# Patient Record
Sex: MALE | Race: WHITE | Employment: OTHER | URBAN - METROPOLITAN AREA
[De-identification: names, ages, dates, MRNs, and addresses within clinical notes are randomized per-mention and may not be internally consistent; named-entity substitution may affect disease eponyms.]

---

## 2018-08-17 ENCOUNTER — HOSPITAL ENCOUNTER (EMERGENCY)
Facility: CLINIC | Age: 63
Discharge: HOME | End: 2018-08-17
Attending: EMERGENCY MEDICINE | Admitting: PSYCHIATRY & NEUROLOGY
Payer: MEDICARE

## 2018-08-17 VITALS
HEART RATE: 66 BPM | OXYGEN SATURATION: 98 % | SYSTOLIC BLOOD PRESSURE: 109 MMHG | RESPIRATION RATE: 16 BRPM | DIASTOLIC BLOOD PRESSURE: 63 MMHG | WEIGHT: 150 LBS | TEMPERATURE: 98.2 F

## 2018-08-17 DIAGNOSIS — F02.818 OTHER FRONTOTEMPORAL DEMENTIA WITH BEHAVIORAL DISTURBANCE: ICD-10-CM

## 2018-08-17 DIAGNOSIS — G31.09 OTHER FRONTOTEMPORAL DEMENTIA WITH BEHAVIORAL DISTURBANCE: ICD-10-CM

## 2018-08-17 LAB
AMPHETAMINES UR QL SCN: NEGATIVE
BARBITURATES UR QL: NEGATIVE
BENZODIAZ UR QL: NEGATIVE
CANNABINOIDS UR QL SCN: POSITIVE
COCAINE UR QL: NEGATIVE
ETHANOL UR QL SCN: NEGATIVE
OPIATES UR QL SCN: NEGATIVE

## 2018-08-17 PROCEDURE — 80307 DRUG TEST PRSMV CHEM ANLYZR: CPT | Performed by: PSYCHIATRY & NEUROLOGY

## 2018-08-17 PROCEDURE — 99285 EMERGENCY DEPT VISIT HI MDM: CPT | Mod: 25 | Performed by: PSYCHIATRY & NEUROLOGY

## 2018-08-17 PROCEDURE — 90791 PSYCH DIAGNOSTIC EVALUATION: CPT

## 2018-08-17 PROCEDURE — 80320 DRUG SCREEN QUANTALCOHOLS: CPT | Performed by: PSYCHIATRY & NEUROLOGY

## 2018-08-17 PROCEDURE — 99284 EMERGENCY DEPT VISIT MOD MDM: CPT | Mod: Z6 | Performed by: PSYCHIATRY & NEUROLOGY

## 2018-08-17 RX ORDER — DONEPEZIL HYDROCHLORIDE 5 MG/1
5 TABLET, FILM COATED ORAL AT BEDTIME
Qty: 30 TABLET | Refills: 1 | Status: SHIPPED | OUTPATIENT
Start: 2018-08-17

## 2018-08-17 ASSESSMENT — ENCOUNTER SYMPTOMS
DYSPHORIC MOOD: 0
SHORTNESS OF BREATH: 0
NERVOUS/ANXIOUS: 0
FEVER: 0
HALLUCINATIONS: 0
ABDOMINAL PAIN: 0

## 2018-08-17 NOTE — ED AVS SNAPSHOT
[facility], Emergency Department    [address]    [place] [address]    Phone:  [phone]    Fax:  [phone]                                       Coy Trejo   MRN: [id number]    Department:  [facility], Emergency Department   Date of Visit:  8/17/2018           Patient Information     Date Of Birth          1955        Your diagnoses for this visit were:     Other frontotemporal dementia with behavioral disturbance        You were seen by Jaswinder Ching DO and Tunde Nuñez MD.        Discharge Instructions       Start aricept 5 mg once a day    Use the resources given to get further evaluation on your memory    24 Hour Appointment Hotline       To make an appointment at any [facility], call [phone] ([phone]). If you don't have a family doctor or clinic, we will help you find one. [facility] clinics are conveniently located to serve the needs of you and your family.             Review of your medicines      START taking        Dose / Directions Last dose taken    donepezil 5 MG tablet   Commonly known as:  ARICEPT   Dose:  5 mg   Quantity:  30 tablet        Take 1 tablet (5 mg) by mouth At Bedtime   Refills:  1          Our records show that you are taking the medicines listed below. If these are incorrect, please call your family doctor or clinic.        Dose / Directions Last dose taken    ATORVASTATIN CALCIUM PO   Dose:  40 mg        Take 40 mg by mouth daily   Refills:  0        GLIMEPIRIDE PO   Dose:  2 mg        Take 2 mg by mouth 2 times daily (before meals)   Refills:  0        sitagliptin-metFORMIN  MG per tablet   Commonly known as:  JANUMET   Dose:  1 tablet        Take 1 tablet by mouth 2 times daily (with meals)   Refills:  0                Prescriptions were sent or printed at these locations (1 Prescription)                   Other Prescriptions                Printed at Department/Unit printer (1 of 1)         donepezil  "(ARICEPT) 5 MG tablet                Orders Needing Specimen Collection     Ordered          18 1751  Drug abuse screen 6 urine (chem dep) ([facility]) - STAT, [place]: STAT, Needs to be Collected     Scheduled Task Status   18 175 Collect Drug abuse screen 6 urine (chem dep) ([facility]) Open   Order Class:  PCU Collect                  Pending Results     No orders found from 8/15/2018 to 2018.            Pending Culture Results     No orders found from 8/15/2018 to 2018.            Pending Results Instructions     If you had any lab results that were not finalized at the time of your Discharge, you can call the ED Lab Result RN at [phone]. You will be contacted by this team for any positive Lab results or changes in treatment. The nurses are available 7 days a week from 10A to 6:30P.  You can leave a message 24 hours per day and they will return your call.        Thank you for choosing [facility]       Thank you for choosing [facility] for your care. Our goal is always to provide you with excellent care. Hearing back from our patients is one way we can continue to improve our services. Please take a few minutes to complete the written survey that you may receive in the mail after you visit with us. Thank you!        [facility] Information     [employer] lets you send messages to your doctor, view your test results, renew your prescriptions, schedule appointments and more. To sign up, go to [email or web address] . Click on \"Log in\" on the left side of the screen, which will take you to the Welcome page. Then click on \"Sign up Now\" on the right side of the page.     You will be asked to enter the access code listed below, as well as some personal information. Please follow the directions to create your username and password.     Your access code is: [email or web address]  Expires: 11/15/2018  7:03 PM     Your access code will  in 90 days. If you need help or a new code, please call your [facility] clinic or " [phone].        Care EveryWhere ID     This is your Care EveryWhere ID. This could be used by other organizations to access your [facility] medical records  [id number]        Equal Access to Services     EDWIGE LEBRON : Vivian Head, wanatalieda luqadaha, qaybta kaalmada yairchedavid, valentino okeefe. So [facility] [phone].    ATENCIÓN: Si habla español, tiene a grimes disposición servicios gratuitos de asistencia lingüística. Llame al [phone].    We comply with applicable federal civil rights laws and [place] laws. We do not discriminate on the basis of race, color, national origin, age, disability, sex, sexual orientation, or gender identity.            After Visit Summary       This is your record. Keep this with you and show to your community pharmacist(s) and doctor(s) at your next visit.

## 2018-08-17 NOTE — ED AVS SNAPSHOT
[facility], [place], Emergency Department    [address]    [place] [address]    Phone:  [phone]    Fax:  [phone]                                       Coy Trejo   MRN: [id number]    Department:  [facility], Emergency Department   Date of Visit:  8/17/2018           After Visit Summary Signature Page     I have received my discharge instructions, and my questions have been answered. I have discussed any challenges I see with this plan with the nurse or doctor.    ..........................................................................................................................................  Patient/Patient Representative Signature      ..........................................................................................................................................  Patient Representative Print Name and Relationship to Patient    ..................................................               ................................................  Date                                            Time    ..........................................................................................................................................  Reviewed by Signature/Title    ...................................................              ..............................................  Date                                                            Time

## 2018-08-18 NOTE — ED PROVIDER NOTES
History     Chief Complaint   Patient presents with     Anxiety     Pt reports he lives alone with his dogs, not remembering thigs and paranoid about his neighbors.      The history is provided by the patient and medical records.     Coy Trejo is a 63 year old male who comes from [place] due to his recent behaviors and memory issues. He believes the neighbors are after him and taking things from him. He has been more aggressive and irritable.  His gf had to move out a few months ago due to this.  The police have been called several times.  His guns were pulled from his home.  They have been to the ED several times and have been discharged home.  They have done full medical workups which are negative.  They have had crisis staff out to their house but nothing came of it. He is not depressed. He denies any suicidal or homicidal thoughts.  He knows something is not right. He describes struggling with his memory.  Family are worried about him and that he can't be home alone. They state they were told by people that they could come to [place] and we would admit him to figure out what is going on.     Please see the 's assessment in [facility] from today (8/17/18) for further details.    I have reviewed the Medications, Allergies, Past Medical and Surgical History, and Social History in the Epic system.    Review of Systems   Constitutional: Negative for fever.   Respiratory: Negative for shortness of breath.    Cardiovascular: Negative for chest pain.   Gastrointestinal: Negative for abdominal pain.   Psychiatric/Behavioral: Positive for behavioral problems. Negative for dysphoric mood, hallucinations (paranoia), self-injury and suicidal ideas. The patient is not nervous/anxious.    All other systems reviewed and are negative.      Physical Exam   BP: 109/63  Pulse: 66  Temp: 98.1  F (36.7  C)  Resp: 18  Weight: 68 kg (150 lb)  SpO2: 97 %      Physical Exam   Constitutional: He is oriented to person,  place, and time. He appears well-developed and well-nourished.   HENT:   Head: Normocephalic and atraumatic.   Mouth/Throat: Oropharynx is clear and moist. No oropharyngeal exudate.   Eyes: Pupils are equal, round, and reactive to light.   Cardiovascular: Normal rate, regular rhythm and normal heart sounds.    Pulmonary/Chest: Effort normal and breath sounds normal. No respiratory distress.   Musculoskeletal: Normal range of motion.   Neurological: He is alert and oriented to person, place, and time.   Skin: Skin is warm.   Psychiatric: He has a normal mood and affect. His speech is normal and behavior is normal. Judgment normal. He is not actively hallucinating. Thought content is paranoid. Thought content is not delusional. Cognition and memory are normal. He expresses no homicidal and no suicidal ideation. He expresses no suicidal plans and no homicidal plans.   Coy is a 64 y/o male who looks his age. He is well groomed with good eye contact.   Nursing note and vitals reviewed.      ED Course     ED Course     Procedures               Labs Ordered and Resulted from Time of ED Arrival Up to the Time of Departure from the ED - No data to display         Assessments & Plan (with Medical Decision Making)   Coy will be discharged home. He is not an imminent risk to himself or others.  It seems that this is most likely a frontal lobe dementia.  He has no history of mental health and has had a complete work up several times in the ED in [place] with no apparent reason for these changes.  It is extremely rare for a new mental health diagnosis to occur at this age.  The paranoia and aggression both fit with a frontotemporal lobe dementia. He will be given aricept 5 mg which may help with his memory issues and even with his behavior issues.  Family was given info on resources in [place] to help with a full memory work up and neurology to manage this.  Family was informed that this is most likely dementia and  looking for memory care for Coy to live will be important.  They understood but were disappointed that he was not admitted to the hospital.      I have reviewed the nursing notes.    I have reviewed the findings, diagnosis, plan and need for follow up with the patient.    New Prescriptions    No medications on file       Final diagnoses:   Other frontotemporal dementia with behavioral disturbance       8/17/2018   [facility], [place], EMERGENCY DEPARTMENT     Tunde Nuñez MD  08/17/18 8847